# Patient Record
Sex: MALE | Race: WHITE | NOT HISPANIC OR LATINO | Employment: FULL TIME | URBAN - METROPOLITAN AREA
[De-identification: names, ages, dates, MRNs, and addresses within clinical notes are randomized per-mention and may not be internally consistent; named-entity substitution may affect disease eponyms.]

---

## 2017-10-12 ENCOUNTER — GENERIC CONVERSION - ENCOUNTER (OUTPATIENT)
Dept: OTHER | Facility: OTHER | Age: 52
End: 2017-10-12

## 2017-11-06 ENCOUNTER — ALLSCRIPTS OFFICE VISIT (OUTPATIENT)
Dept: OTHER | Facility: OTHER | Age: 52
End: 2017-11-06

## 2017-11-06 DIAGNOSIS — N45.1 EPIDIDYMITIS: ICD-10-CM

## 2017-11-06 LAB
BILIRUB UR QL STRIP: NEGATIVE
CLARITY UR: NORMAL
COLOR UR: NORMAL
GLUCOSE (HISTORICAL): NEGATIVE
HGB UR QL STRIP.AUTO: NEGATIVE
KETONES UR STRIP-MCNC: NEGATIVE MG/DL
LEUKOCYTE ESTERASE UR QL STRIP: NEGATIVE
NITRITE UR QL STRIP: NEGATIVE
PH UR STRIP.AUTO: 7 [PH]
PROT UR STRIP-MCNC: NEGATIVE MG/DL
SP GR UR STRIP.AUTO: 1.01
UROBILINOGEN UR QL STRIP.AUTO: 0.2

## 2017-11-16 ENCOUNTER — TRANSCRIBE ORDERS (OUTPATIENT)
Dept: ADMINISTRATIVE | Facility: HOSPITAL | Age: 52
End: 2017-11-16

## 2017-11-16 ENCOUNTER — APPOINTMENT (OUTPATIENT)
Dept: LAB | Facility: MEDICAL CENTER | Age: 52
End: 2017-11-16
Payer: COMMERCIAL

## 2017-11-16 ENCOUNTER — HOSPITAL ENCOUNTER (OUTPATIENT)
Dept: ULTRASOUND IMAGING | Facility: MEDICAL CENTER | Age: 52
Discharge: HOME | End: 2017-11-16
Payer: COMMERCIAL
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
## 2017-11-16 DIAGNOSIS — N45.1 EPIDIDYMITIS: ICD-10-CM

## 2017-11-16 LAB — PSA SERPL-MCNC: 0.5 NG/ML (ref 0–4)

## 2017-11-16 PROCEDURE — 76870 US EXAM SCROTUM: CPT

## 2017-11-16 PROCEDURE — 36415 COLL VENOUS BLD VENIPUNCTURE: CPT

## 2017-11-16 PROCEDURE — G0103 PSA SCREENING: HCPCS

## 2017-11-20 ENCOUNTER — ALLSCRIPTS OFFICE VISIT (OUTPATIENT)
Dept: OTHER | Facility: OTHER | Age: 52
End: 2017-11-20

## 2017-11-20 LAB
BILIRUB UR QL STRIP: NORMAL
CLARITY UR: NORMAL
COLOR UR: YELLOW
GLUCOSE (HISTORICAL): NORMAL
HGB UR QL STRIP.AUTO: NORMAL
KETONES UR STRIP-MCNC: NORMAL MG/DL
LEUKOCYTE ESTERASE UR QL STRIP: NORMAL
NITRITE UR QL STRIP: NORMAL
PH UR STRIP.AUTO: 5.5 [PH]
PROT UR STRIP-MCNC: NORMAL MG/DL
SP GR UR STRIP.AUTO: 1.03
UROBILINOGEN UR QL STRIP.AUTO: 0.2

## 2018-01-10 NOTE — MISCELLANEOUS
Message  Patient called stating he was on vacation recently in [facility] and did extensive bike riding while away  He feels his epididymitis has returned  Mild discomfort but he wants to treat it before it can become a problem  Consulted Dr Toya Christy who was in agreement with immediate treatment but since he was last seen in October, 2016, an follow-up office visit is needed  Script escribed to [facility] [place] [id number]  Appt  with DOMINIC Marsh scheduled for 10/25/17        Signatures   Electronically signed by : Heather Garcia, ; Oct 12 2017 11:11AM EST                       (Author)

## 2018-01-13 VITALS
WEIGHT: 197 LBS | BODY MASS INDEX: 32.82 KG/M2 | DIASTOLIC BLOOD PRESSURE: 86 MMHG | SYSTOLIC BLOOD PRESSURE: 130 MMHG | HEIGHT: 65 IN

## 2018-04-04 ENCOUNTER — TELEPHONE (OUTPATIENT)
Dept: UROLOGY | Facility: AMBULATORY SURGERY CENTER | Age: 53
End: 2018-04-04

## 2018-04-04 DIAGNOSIS — N40.0 ENLARGED PROSTATE: Primary | ICD-10-CM

## 2018-04-04 DIAGNOSIS — N45.1 EPIDIDYMITIS: Primary | ICD-10-CM

## 2018-04-04 DIAGNOSIS — N45.1 EPIDIDYMITIS: ICD-10-CM

## 2018-04-04 RX ORDER — SULFAMETHOXAZOLE AND TRIMETHOPRIM 800; 160 MG/1; MG/1
1 TABLET ORAL EVERY 12 HOURS SCHEDULED
Qty: 20 TABLET | Refills: 0 | Status: SHIPPED | OUTPATIENT
Start: 2018-04-04 | End: 2018-04-14

## 2018-04-04 NOTE — TELEPHONE ENCOUNTER
Spoke with patient, he said he feels like he is getting epididymitis again and would like some medication  Please call him back

## 2018-04-04 NOTE — TELEPHONE ENCOUNTER
C/O twinges in right testis  This is usually indication infection starting, denies fever  Requesting medication   Will direct to

## 2018-04-04 NOTE — TELEPHONE ENCOUNTER
Dr Jesenia Roper ordered Bactrim Erx to pharm  Needs 6 week appt with repeat culture, PSA and CMP  Will attempt pt again

## 2018-04-18 DIAGNOSIS — N45.1 EPIDIDYMITIS: Primary | ICD-10-CM

## 2018-04-18 RX ORDER — CIPROFLOXACIN 250 MG/1
250 TABLET, FILM COATED ORAL EVERY 12 HOURS SCHEDULED
Qty: 20 TABLET | Refills: 0 | Status: SHIPPED | OUTPATIENT
Start: 2018-04-18 | End: 2018-04-28

## 2018-11-26 ENCOUNTER — OFFICE VISIT (OUTPATIENT)
Dept: UROLOGY | Facility: MEDICAL CENTER | Age: 53
End: 2018-11-26
Payer: COMMERCIAL

## 2018-11-26 VITALS
WEIGHT: 195 LBS | SYSTOLIC BLOOD PRESSURE: 130 MMHG | BODY MASS INDEX: 32.49 KG/M2 | DIASTOLIC BLOOD PRESSURE: 80 MMHG | HEIGHT: 65 IN

## 2018-11-26 DIAGNOSIS — Z12.5 ENCOUNTER FOR PROSTATE CANCER SCREENING: ICD-10-CM

## 2018-11-26 DIAGNOSIS — N30.00 ACUTE CYSTITIS WITHOUT HEMATURIA: Primary | ICD-10-CM

## 2018-11-26 DIAGNOSIS — N45.1 EPIDIDYMITIS, BILATERAL: ICD-10-CM

## 2018-11-26 LAB
SL AMB  POCT GLUCOSE, UA: NORMAL
SL AMB LEUKOCYTE ESTERASE,UA: NORMAL
SL AMB POCT BILIRUBIN,UA: NORMAL
SL AMB POCT BLOOD,UA: NORMAL
SL AMB POCT CLARITY,UA: CLEAR
SL AMB POCT COLOR,UA: YELLOW
SL AMB POCT KETONES,UA: NORMAL
SL AMB POCT NITRITE,UA: NORMAL
SL AMB POCT PH,UA: 6
SL AMB POCT SPECIFIC GRAVITY,UA: 1.03
SL AMB POCT URINE PROTEIN: NORMAL
SL AMB POCT UROBILINOGEN: 0.2

## 2018-11-26 PROCEDURE — 81003 URINALYSIS AUTO W/O SCOPE: CPT | Performed by: UROLOGY

## 2018-11-26 PROCEDURE — 99214 OFFICE O/P EST MOD 30 MIN: CPT | Performed by: UROLOGY

## 2018-11-26 RX ORDER — ATORVASTATIN CALCIUM 20 MG/1
TABLET, FILM COATED ORAL
COMMUNITY

## 2018-11-26 RX ORDER — LORAZEPAM 0.5 MG/1
TABLET ORAL
COMMUNITY

## 2018-11-26 RX ORDER — TRAMADOL HYDROCHLORIDE 50 MG/1
50 TABLET ORAL EVERY 6 HOURS PRN
COMMUNITY
Start: 2018-10-25 | End: 2019-10-25

## 2018-11-26 RX ORDER — OMEPRAZOLE 20 MG/1
20 CAPSULE, DELAYED RELEASE ORAL DAILY
Refills: 4 | COMMUNITY
Start: 2018-10-08

## 2018-11-26 RX ORDER — MELOXICAM 15 MG/1
15 TABLET ORAL DAILY
Refills: 5 | COMMUNITY
Start: 2018-10-19

## 2018-11-26 RX ORDER — MULTIVITAMIN
1 TABLET ORAL
COMMUNITY

## 2018-11-26 RX ORDER — NAPROXEN 500 MG/1
500 TABLET ORAL 2 TIMES DAILY WITH MEALS
Refills: 0 | COMMUNITY
Start: 2018-10-28

## 2018-11-26 RX ORDER — IBUPROFEN 800 MG/1
TABLET ORAL
Refills: 0 | COMMUNITY
Start: 2018-09-11

## 2018-11-26 RX ORDER — METHYLPREDNISOLONE 4 MG/1
TABLET ORAL
COMMUNITY

## 2018-11-26 NOTE — LETTER
2018     Bhavna Culver DO  [address] [address]    Patient: Darwin Collier   YOB: 1965   Date of Visit: 2018       Dear Dr Lindsey Hinkle: Thank you for referring Darwin Collier to me for evaluation  Below are my notes for this consultation  If you have questions, please do not hesitate to call me  I look forward to following your patient along with you  Sincerely,        Oly Luna MD        CC: No Recipients  Oly Luna MD  2018  4:14 PM  Sign at close encounter  [address] [facility]  [address], [address] [place] [address]  [phone]  www  [facility]  org      NAME: Darwin Collier  AGE: 48 y o  SEX: male  : 1965   MRN: [id number]    DATE: 2018  TIME: 3:15 PM    Assessment and Plan:  Bilateral epididymal orchitis, chronic intermittent  I do not believe this to be necessarily infectious in nature  I wish to avoid overtreatment with antibiotics because I believe this may be a function of his extreme driving regimen  He also may wish to abstain from ejaculation periodically when he starts to become tender  He should be wearing a jockstrap were some other form of support especially when driving  Check PSA, send results to him  Otherwise from a urological standpoint he is doing well  Follow-up 1 year or as needed  Chief Complaint   No chief complaint on file  History of Present Illness   82-year-old man established patient with a history of epididymitis  He has had this for many years and used to see Dr Severo Mustache for it  He has about 1 episode per year  He had an episode  and was seen by his primary care provider who placed him on Cipro 250 mg b i d  For 7 days  He dropped about 5 hr per day and a [employer] for work  Culture was negative  Urinalysis was negative at that time    CT scan abdomen and pelvis and chest showed normal kidneys and bladder March 2018  Urinalysis is negative today  His symptoms today are perhaps mild bilateral testicular discomfort and mild bilateral groin pulling  PSA was 0 51 year ago  he has an ejaculation once per day  The following portions of the patient's history were reviewed and updated as appropriate: allergies, current medications, past family history, past medical history, past social history, past surgical history and problem list     Review of Systems   Review of Systems   Genitourinary: Positive for testicular pain  Negative for difficulty urinating, dysuria and hematuria  Active Problem List   There is no problem list on file for this patient  Objective   There were no vitals taken for this visit  Physical Exam   Constitutional: He is oriented to person, place, and time  He appears well-developed and well-nourished  HENT:   Head: Normocephalic and atraumatic  Eyes: EOM are normal    Neck: Normal range of motion  Pulmonary/Chest: Effort normal    Genitourinary: Rectum normal, prostate normal and penis normal  No penile tenderness  Genitourinary Comments: Rectal exam reveals normal tone, no masses and his prostate is about 40 g, smooth symmetric and benign  Testicles are descended bilaterally and are within normal limits except for slightly higher volume normal bilaterally  No nodules  Very minimal tenderness, no epididymal abnormalities  Musculoskeletal: Normal range of motion  Neurological: He is alert and oriented to person, place, and time  Skin: Skin is warm and dry  Psychiatric: He has a normal mood and affect  His behavior is normal  Judgment and thought content normal            Current Medications   No current outpatient prescriptions on file          Lori Villalobos MD

## 2018-11-26 NOTE — PATIENT INSTRUCTIONS
Where jockstrap or more supportive underwear  Abstain from ejaculation when you experience symptoms

## 2018-11-26 NOTE — PROGRESS NOTES
[address] [facility]  [facility]  [address] [facility] [place]  [place], [address] [facility]  [phone]  www  [facility]  org      NAME: Drew Granger  AGE: 48 y o  SEX: male  : 1965   MRN: [id number]    DATE: 2018  TIME: 3:15 PM    Assessment and Plan:  Bilateral epididymal orchitis, chronic intermittent  I do not believe this to be necessarily infectious in nature  I wish to avoid overtreatment with antibiotics because I believe this may be a function of his extreme driving regimen  He also may wish to abstain from ejaculation periodically when he starts to become tender  He should be wearing a jockstrap were some other form of support especially when driving  Check PSA, send results to him  Otherwise from a urological standpoint he is doing well  Follow-up 1 year or as needed  Chief Complaint   No chief complaint on file  History of Present Illness   51-year-old man established patient with a history of epididymitis  He has had this for many years and used to see Dr April Gonzalez for it  He has about 1 episode per year  He had an episode  and was seen by his primary care provider who placed him on Cipro 250 mg b i d  For 7 days  He dropped about 5 hr per day and a [employer] for work  Culture was negative  Urinalysis was negative at that time  CT scan abdomen and pelvis and chest showed normal kidneys and bladder 2018  Urinalysis is negative today  His symptoms today are perhaps mild bilateral testicular discomfort and mild bilateral groin pulling  PSA was 0 51 year ago  he has an ejaculation once per day  The following portions of the patient's history were reviewed and updated as appropriate: allergies, current medications, past family history, past medical history, past social history, past surgical history and problem list     Review of Systems   Review of Systems   Genitourinary: Positive for testicular pain   Negative for difficulty urinating, dysuria and hematuria  Active Problem List   There is no problem list on file for this patient  Objective   There were no vitals taken for this visit  Physical Exam   Constitutional: He is oriented to person, place, and time  He appears well-developed and well-nourished  HENT:   Head: Normocephalic and atraumatic  Eyes: EOM are normal    Neck: Normal range of motion  Pulmonary/Chest: Effort normal    Genitourinary: Rectum normal, prostate normal and penis normal  No penile tenderness  Genitourinary Comments: Rectal exam reveals normal tone, no masses and his prostate is about 40 g, smooth symmetric and benign  Testicles are descended bilaterally and are within normal limits except for slightly higher volume normal bilaterally  No nodules  Very minimal tenderness, no epididymal abnormalities  Musculoskeletal: Normal range of motion  Neurological: He is alert and oriented to person, place, and time  Skin: Skin is warm and dry  Psychiatric: He has a normal mood and affect  His behavior is normal  Judgment and thought content normal            Current Medications   No current outpatient prescriptions on file          Maliha Wilson MD

## 2018-12-10 ENCOUNTER — TELEPHONE (OUTPATIENT)
Dept: UROLOGY | Facility: AMBULATORY SURGERY CENTER | Age: 53
End: 2018-12-10

## 2018-12-10 NOTE — TELEPHONE ENCOUNTER
Spoke with patient, patient said he feels like he cannot empty his bladder fully and he would like to speak with a nurse about it  Please call back

## 2018-12-11 NOTE — TELEPHONE ENCOUNTER
Called pt  He wanted to let Dr Latasha Amaya know that he is still having the same symptoms that he had when he was here for his OV  C/o L testicle pain on and off, and a warm feeling after he voids  He did not get a jock strap yet, and I recommended he buy one  I will forward to Dr Latasha Amaya for any recommendations

## 2018-12-12 NOTE — TELEPHONE ENCOUNTER
Spoke with the patient; Dr Patty Mcnamara has no further recommendations at this time  He once more emphasizes that the patient should use a jockstrap  Patient understands and will call back if this doesn't relieve his symptoms

## 2018-12-14 DIAGNOSIS — R82.90 CLOUDY URINE: Primary | ICD-10-CM

## 2018-12-14 NOTE — TELEPHONE ENCOUNTER
Spoke with the patient; Patient reports that his urine is cloudy, has an odor, and that there's a slight burning sensation when he urinates  I've ordered a urine culture and faxed the request to his preferred lab  He's somewhat hesitant about the culture- he states the other cultures he's had have all come back negative  Dr Boo Malhotra is not in the office at this time and I can not get his input at this time  I explained that there is no hurt in getting one   Regardless of the result, I'll inform the doctor of the patient's current state and work on treating him once his results come back

## 2018-12-14 NOTE — TELEPHONE ENCOUNTER
Patient called back and would like to speak with the nurse said urine is cloudy and has a order [phone]

## 2018-12-17 NOTE — TELEPHONE ENCOUNTER
Per HNL, there was no growth on his culture after 24 hours  This is the third culture to come back negative  Please advise

## 2018-12-17 NOTE — TELEPHONE ENCOUNTER
Spoke with the patient; I explained that Dr Boo Malhotra would like the patient to return for re-evaluation with [facility], Avani Zamorano  The patient has agreed to this, but states that he's very busy for the next few weeks and can't schedule anything at this time  He will call closer to the end of this month or the beginning of the next to arrange an appointment

## 2019-03-15 ENCOUNTER — HOSPITAL ENCOUNTER (OUTPATIENT)
Dept: RADIOLOGY | Facility: HOSPITAL | Age: 54
Discharge: HOME | End: 2019-03-15
Payer: COMMERCIAL

## 2019-03-15 ENCOUNTER — TRANSCRIBE ORDERS (OUTPATIENT)
Dept: ADMINISTRATIVE | Facility: HOSPITAL | Age: 54
End: 2019-03-15

## 2019-03-15 DIAGNOSIS — M15.9 PRIMARY OSTEOARTHRITIS INVOLVING MULTIPLE JOINTS: ICD-10-CM

## 2019-03-15 DIAGNOSIS — M15.9 PRIMARY OSTEOARTHRITIS INVOLVING MULTIPLE JOINTS: Primary | ICD-10-CM

## 2019-03-15 PROCEDURE — 73560 X-RAY EXAM OF KNEE 1 OR 2: CPT

## 2019-03-15 PROCEDURE — 73130 X-RAY EXAM OF HAND: CPT

## 2019-03-19 ENCOUNTER — OFFICE VISIT (OUTPATIENT)
Dept: CARDIOLOGY CLINIC | Facility: CLINIC | Age: 54
End: 2019-03-19
Payer: COMMERCIAL

## 2019-03-19 VITALS
BODY MASS INDEX: 33.99 KG/M2 | OXYGEN SATURATION: 98 % | SYSTOLIC BLOOD PRESSURE: 128 MMHG | WEIGHT: 204 LBS | HEIGHT: 65 IN | DIASTOLIC BLOOD PRESSURE: 82 MMHG | HEART RATE: 74 BPM

## 2019-03-19 DIAGNOSIS — R07.9 CHEST PAIN, UNSPECIFIED TYPE: ICD-10-CM

## 2019-03-19 DIAGNOSIS — R00.2 PALPITATIONS: Primary | ICD-10-CM

## 2019-03-19 PROCEDURE — 93000 ELECTROCARDIOGRAM COMPLETE: CPT | Performed by: INTERNAL MEDICINE

## 2019-03-19 PROCEDURE — 99205 OFFICE O/P NEW HI 60 MIN: CPT | Performed by: INTERNAL MEDICINE

## 2019-03-19 RX ORDER — ACETAMINOPHEN AND CODEINE PHOSPHATE 300; 30 MG/1; MG/1
1 TABLET ORAL 2 TIMES DAILY PRN
Refills: 2 | COMMUNITY
Start: 2019-02-23

## 2019-03-19 RX ORDER — HYDROCODONE BITARTRATE AND ACETAMINOPHEN 5; 325 MG/1; MG/1
TABLET ORAL
COMMUNITY
Start: 2019-02-23

## 2019-03-19 RX ORDER — CHLORAL HYDRATE 500 MG
CAPSULE ORAL
COMMUNITY

## 2019-03-19 RX ORDER — GABAPENTIN 100 MG/1
CAPSULE ORAL
COMMUNITY
Start: 2019-02-28

## 2019-03-19 NOTE — PROGRESS NOTES
Consultation - Cardiology   Ashley Silva 48 y o  male MRN: [id number]  Unit/Bed#:  Encounter: [id number]      Assessment and Plan:  Chest pain  Atypical- focal, reproducible, nonexertional, mostly indicative of musculoskeletal pain  Especially given his symptoms of multiple arthralgias, may have an underlying undiagnosed rheumatological disorder  However, in view of significant family history of early CAD, hyperlipidemia, former smoking history, would obtain an exercise nuclear stress test for definitive evaluation to rule out obstructive coronaries  Would also obtain echo evaluate LV function given his significant alcohol intake and recent weight gain and lower extremity edema  - the edema could also be secondary to significant in khurram of ibuprofen leading to water retention, or gabapentin, or prolonged sitting at his work place  -EKG in the office today revealed normal sinus rhythm, normal axis, short RI interval, no significant ST/T-wave changes  -Stress echo in 2016 at [facility] prior to his hernia surgery for chest discomfort- Exercised for 9 minutes, although EKG had revealed 1 5 mm ST depressions in the inferolateral leads, stress echo demonstrated normal wall motion upon exercise  Hyperlipidemia:  Last lipid panel in 10/2018 at [facility] revealed total cholesterol 158, triglyceride 137, HDL 40, LDL 91( much improved compared to prior lipid panels)  On atorvastatin 20 HS, would continue the same  Arthralgias:  Likely underlying chronic inflammatory/ rheumatological condition  Had elevated CRP, ESR, WBC count, however YESSI, CC PE and RA panel was negative  His x-rays were normal   Undergoing further evaluation by Rheumatology at [facility]    He had a recent swelling in one of the interphalangeal joints the right index finger which he popped open and drained white material   Advised him to cut down on alcohol intake    History of Present Illness   Physician Requesting Consult: No att  providers found  Reason for Consult / Principal Problem:     HPI: David Wallace is a 48y o  year old male with a  PMH of chronic intermittent bilateral epidydimitis, h/o abdominal hernia s/p mesh repair, arthralgias in fingers and toes)(  ESR, CRP and WBC high, however had negative YESSI, RF, and CCP, underwent xrays and rheum evaluation at [facility] - was told possible osteoarthritis however undergoing more workup), presentation to ER with chest pain in 2018 was diagnosed with costochondritis and discharged with prednisone taper, no known cardiac history presents to the office for a first time evaluation for chest pain and family history of CAD  He   had an exercise stress echo in  prior to diagnosis of his hernia at which time he had chest/abdominal discomfort  The stress test was negative  He notes he started having intermittent sharp focal chest pains in 2018, then developed pain in the fingers, then progressed to pain in his toes and then his knees over the next several months  He underwent workup by Neurology and subsequently by Rheumatology at [facility], at which time it was noted that he had elevated CRP, ESR, WBC count, however YESSI, CC PE and RA panel was negative  He was told this is probably osteoarthritis, however patient notes that the symptoms started only a few months ago and has had-fatigue and reduced functional capacity since then  He had a rash under his axilla month ago is now resolved  He denies fevers  Regarding his chest pain, he notes sharp, intermittent, reproducible, focal on multiple points on his chest wall, exertion improves the pain  He at times feels lightheaded  No orthopnea PND  He used to be a smoker, smoked for about 10 years, 1-1 5 packs a day, stopped 20 years ago  Notes occasional marijuana use  Used to be heavy drinker in the past, 15-18 beers a day, he now drinks only on weekends but binge drinks 5-7 beers per night     His father underwent CABG the age of 54, his paternal aunt  of heart failure the age of 71, his paternal uncle has an LVAD  He also notes 8-9 lb weight gain in the last several weeks, notes lower extremity edema  He also takes up to 1800 mg of ibuprofen a day for his pains  Has been recently started on gabapentin, takes lorazepam 0 5 mg alternate day for anxiety  Consults    Review of Systems:  Review of Systems    As per HPI    Historical Information   Past Medical History:   Diagnosis Date    BPH without obstruction/lower urinary tract symptoms     Epididymitis     Pain in scrotum      Past Surgical History:   Procedure Laterality Date    KNEE SURGERY       Social History     Substance and Sexual Activity   Alcohol Use Not on file     Social History     Substance and Sexual Activity   Drug Use Not on file     Social History     Tobacco Use   Smoking Status Not on file     Family History: non-contributory    Meds/Allergies   PTA meds:   Cannot display prior to admission medications because the patient has not been admitted in this contact  Allergies   Allergen Reactions    Doxycycline Swelling    Bactrim [Sulfamethoxazole-Trimethoprim] Rash       Objective   Vitals: There were no vitals taken for this visit  , There is no height or weight on file to calculate BMI ,       Invasive Devices          None          Physical Exam    Gen: No acute distress  HEENT: anicteric, mucous membranes moist  Neck:  No JVD  Heart: regular, normal s1 and s2, no murmur/rub or gallop  Lungs :clear to auscultation bilaterally, no rales/rhonchi or wheeze  Abdomen: soft nontender, normoactive bowel sounds, no organomegaly  Ext:  1+ pitting edema in bilateral lower extremities    Skin: warm, no rashes      Lab Results:     No results found for: CKTOTAL, CKMB, CKMBINDEX, TROPONINI    No results found for: GLUCOSE, CALCIUM, NA, K, CO2, CL, BUN, CREATININE    No results found for: WBC, HGB, HCT, MCV, PLT    No results found for: CHOL  No results found for: HDL  No results found for: LDLCALC  No results found for: TRIG    No results found for: ALT, AST            Imaging: I have personally reviewed pertinent reports

## 2019-04-12 ENCOUNTER — HOSPITAL ENCOUNTER (OUTPATIENT)
Dept: NON INVASIVE DIAGNOSTICS | Facility: CLINIC | Age: 54
Discharge: HOME | End: 2019-04-12
Payer: COMMERCIAL

## 2019-04-12 DIAGNOSIS — R07.9 CHEST PAIN, UNSPECIFIED TYPE: ICD-10-CM

## 2019-04-12 LAB
ARRHY DURING EX: NORMAL
CHEST PAIN STATEMENT: NORMAL
MAX DIASTOLIC BP: 90 MMHG
MAX HEART RATE: 184 BPM
MAX PREDICTED HEART RATE: 169 BPM
MAX. SYSTOLIC BP: 172 MMHG
PROTOCOL NAME: NORMAL
REASON FOR TERMINATION: NORMAL
TARGET HR FORMULA: NORMAL
TEST INDICATION: NORMAL
TIME IN EXERCISE PHASE: NORMAL

## 2019-04-12 PROCEDURE — 78452 HT MUSCLE IMAGE SPECT MULT: CPT

## 2019-04-12 PROCEDURE — A9502 TC99M TETROFOSMIN: HCPCS

## 2019-04-12 PROCEDURE — 78452 HT MUSCLE IMAGE SPECT MULT: CPT | Performed by: INTERNAL MEDICINE

## 2019-04-12 PROCEDURE — 93017 CV STRESS TEST TRACING ONLY: CPT

## 2019-04-12 PROCEDURE — 93306 TTE W/DOPPLER COMPLETE: CPT | Performed by: INTERNAL MEDICINE

## 2019-04-12 PROCEDURE — 93016 CV STRESS TEST SUPVJ ONLY: CPT | Performed by: INTERNAL MEDICINE

## 2019-04-12 PROCEDURE — 93306 TTE W/DOPPLER COMPLETE: CPT

## 2019-04-12 PROCEDURE — 93018 CV STRESS TEST I&R ONLY: CPT | Performed by: INTERNAL MEDICINE

## 2021-03-10 DIAGNOSIS — Z23 ENCOUNTER FOR IMMUNIZATION: ICD-10-CM

## 2021-03-15 ENCOUNTER — IMMUNIZATIONS (OUTPATIENT)
Dept: FAMILY MEDICINE CLINIC | Facility: HOSPITAL | Age: 56
End: 2021-03-15

## 2021-03-15 DIAGNOSIS — Z23 ENCOUNTER FOR IMMUNIZATION: Primary | ICD-10-CM

## 2021-03-15 PROCEDURE — 0001A SARS-COV-2 / COVID-19 MRNA VACCINE (PFIZER-BIONTECH) 30 MCG: CPT

## 2021-03-15 PROCEDURE — 91300 SARS-COV-2 / COVID-19 MRNA VACCINE (PFIZER-BIONTECH) 30 MCG: CPT

## 2021-04-05 ENCOUNTER — IMMUNIZATIONS (OUTPATIENT)
Dept: FAMILY MEDICINE CLINIC | Facility: HOSPITAL | Age: 56
End: 2021-04-05

## 2021-04-05 DIAGNOSIS — Z23 ENCOUNTER FOR IMMUNIZATION: Primary | ICD-10-CM

## 2021-04-05 PROCEDURE — 91300 SARS-COV-2 / COVID-19 MRNA VACCINE (PFIZER-BIONTECH) 30 MCG: CPT

## 2021-04-05 PROCEDURE — 0002A SARS-COV-2 / COVID-19 MRNA VACCINE (PFIZER-BIONTECH) 30 MCG: CPT

## 2021-08-20 ENCOUNTER — IMMUNIZATIONS (OUTPATIENT)
Dept: FAMILY MEDICINE CLINIC | Facility: HOSPITAL | Age: 56
End: 2021-08-20

## 2021-08-20 DIAGNOSIS — Z23 ENCOUNTER FOR IMMUNIZATION: Primary | ICD-10-CM

## 2021-08-20 PROCEDURE — 91300 SARS-COV-2 / COVID-19 MRNA VACCINE (PFIZER-BIONTECH) 30 MCG: CPT

## 2021-08-20 PROCEDURE — 0001A SARS-COV-2 / COVID-19 MRNA VACCINE (PFIZER-BIONTECH) 30 MCG: CPT
